# Patient Record
Sex: FEMALE | Employment: FULL TIME | URBAN - METROPOLITAN AREA
[De-identification: names, ages, dates, MRNs, and addresses within clinical notes are randomized per-mention and may not be internally consistent; named-entity substitution may affect disease eponyms.]

---

## 2024-05-16 ENCOUNTER — HOSPITAL ENCOUNTER (OUTPATIENT)
Dept: PHYSICAL THERAPY | Age: 19
Setting detail: THERAPIES SERIES
Discharge: HOME | End: 2024-05-16
Payer: COMMERCIAL

## 2024-05-16 DIAGNOSIS — R26.0 ATAXIC GAIT: ICD-10-CM

## 2024-05-16 DIAGNOSIS — M62.81 MUSCLE WEAKNESS (GENERALIZED): Primary | ICD-10-CM

## 2024-05-16 PROCEDURE — 97161 PT EVAL LOW COMPLEX 20 MIN: CPT

## 2024-05-16 PROCEDURE — 97530 THERAPEUTIC ACTIVITIES: CPT

## 2024-05-16 PROCEDURE — 97112 NEUROMUSCULAR REEDUCATION: CPT

## 2024-05-16 PROCEDURE — 97110 THERAPEUTIC EXERCISES: CPT
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
## 2024-05-20 ENCOUNTER — HOSPITAL ENCOUNTER (OUTPATIENT)
Dept: PHYSICAL THERAPY | Age: 19
Setting detail: THERAPIES SERIES
Discharge: HOME | End: 2024-05-20
Payer: COMMERCIAL

## 2024-05-20 PROCEDURE — 97110 THERAPEUTIC EXERCISES: CPT

## 2024-05-20 PROCEDURE — 97112 NEUROMUSCULAR REEDUCATION: CPT

## 2024-05-20 NOTE — FLOWSHEET NOTE
Dry Needle 3+ muscle (23222)     Iontophoresis (73979)    VASO (57404)     Ultrasound (65032)    Group Therapy (06814)     Estim Attended (82989)    Canalith Repositioning (58667)     Other:    Other:    Total Timed Code Tx Minutes 60 4       Total Treatment Minutes 60        Charge Justification:  (74947) THERAPEUTIC EXERCISE - Provided verbal/tactile cueing for activities related to strengthening, flexibility, endurance, ROM performed to prevent loss of range of motion, maintain or improve muscular strength or increase flexibility, following either an injury or surgery.   (00258) NEUROMUSCULAR RE-EDUCATION - Therapeutic procedure, 1 or more areas, each 15 minutes; neuromuscular reeducation of movement, balance, coordination, kinesthetic sense, posture, and/or proprioception for sitting and/or standing activities    GOALS     Patient stated goal: To be able to return to PLOF activity and work  [] Progressing: [] Met: [] Not Met: [] Adjusted    Therapist goals for Patient:   Short Term Goals: To be achieved in: 2 weeks  1. Independent in HEP and progression per patient tolerance, in order to prevent re-injury.   [] Progressing: [] Met: [] Not Met: [] Adjusted  2. Patient will have a decrease in pain to <1-2/10 to facilitate improvement in movement, function, and ADLs as indicated by Functional Deficits.  [] Progressing: [] Met: [] Not Met: [] Adjusted    Long Term Goals: To be achieved in: 12 weeks  1. Disability index score of 20% or less for the Modified Oswestry to assist with reaching prior level of function with activities such as tolerating sit .  [] Progressing: [] Met: [] Not Met: [] Adjusted  2. Patient will demonstrate increased AROM of Lumbar Spine to WFL without pain to allow for proper joint functioning to enable patient to maintain proper up right standing posture.   [] Progressing: [] Met: [] Not Met: [] Adjusted  3. Patient will demonstrate increased Strength of Lumbar and Core Stabilizers to

## 2024-05-29 ENCOUNTER — HOSPITAL ENCOUNTER (OUTPATIENT)
Dept: PHYSICAL THERAPY | Age: 19
Setting detail: THERAPIES SERIES
Discharge: HOME | End: 2024-05-29
Payer: COMMERCIAL

## 2024-05-29 PROCEDURE — 97110 THERAPEUTIC EXERCISES: CPT | Performed by: PHYSICAL THERAPY ASSISTANT

## 2024-05-29 PROCEDURE — 97112 NEUROMUSCULAR REEDUCATION: CPT | Performed by: PHYSICAL THERAPY ASSISTANT

## 2024-06-05 ENCOUNTER — HOSPITAL ENCOUNTER (OUTPATIENT)
Dept: PHYSICAL THERAPY | Age: 19
Setting detail: THERAPIES SERIES
Discharge: HOME | End: 2024-06-05
Payer: COMMERCIAL

## 2024-06-05 PROCEDURE — 97110 THERAPEUTIC EXERCISES: CPT | Performed by: PHYSICAL THERAPY ASSISTANT

## 2024-06-05 PROCEDURE — 97530 THERAPEUTIC ACTIVITIES: CPT | Performed by: PHYSICAL THERAPY ASSISTANT

## 2024-06-05 PROCEDURE — 97112 NEUROMUSCULAR REEDUCATION: CPT | Performed by: PHYSICAL THERAPY ASSISTANT

## 2024-06-05 NOTE — FLOWSHEET NOTE
[facility]- Outpatient Rehabilitation and Therapy [address], [place] [address] office: [phone] fax: [phone]         Physical Therapy: TREATMENT/PROGRESS NOTE   Patient: Telma Guerrero (19 y.o. female)   Examination Date: 2024   :  2005 MRN: [id number]   Visit #: 4   Insurance Allowable Auth Needed   20 []Yes    [x]No    Insurance: Payor: [employer] / Plan: [employer] - OPTIONS / Product Type: *No Product type* /   Insurance ID: [id number] - (Commercial)  Secondary Insurance (if applicable):    Treatment Diagnosis:     ICD-10-CM    1. Muscle weakness (generalized)  M62.81       2. Ataxic gait  R26.0          Medical Diagnosis:   Stable burst fracture of first lumbar vertebra, initial encounter for closed fracture [S32.011D] s/p Posterior Lumbar Spine Fusion,[id number] 4/10/24    Referring Physician: Thanh Rosado MD  PCP: Kendrick Novak MD     Plan of care signed (Y/N):     Date of Patient follow up with Physician: 2024     Progress Report/POC: NO  POC update due: (10 visits /OR AUTH LIMITS, whichever is less)  2024                                             Precautions/ Contra-indications:           Latex allergy:  NO  Pacemaker:    NO  Contraindications for Manipulation: recent surgical history (relative)  Date of Surgery: 4/10/2024  Other:    Red Flags:  None    C-SSRS Triggered by Intake questionnaire:   Patient answered 'NO' to both behavioral questions on intake.  No further screening warranted    Preferred Language for Healthcare:   [x] English       [] other:    SUBJECTIVE EXAMINATION     Patient stated complaint: States that she did OK after last visit but did have some muscle soreness. States that she stayed at her aunts for two days sleeping on a couch and watching a small baby, 4 months old, and is still a little sore from that.  She did not really do much in the way of exercises secondary to being there. Today

## 2024-06-12 ENCOUNTER — HOSPITAL ENCOUNTER (OUTPATIENT)
Dept: PHYSICAL THERAPY | Age: 19
Setting detail: THERAPIES SERIES
Discharge: HOME | End: 2024-06-12
Payer: COMMERCIAL

## 2024-06-12 PROCEDURE — 97110 THERAPEUTIC EXERCISES: CPT | Performed by: PHYSICAL THERAPY ASSISTANT

## 2024-06-12 PROCEDURE — 97112 NEUROMUSCULAR REEDUCATION: CPT | Performed by: PHYSICAL THERAPY ASSISTANT

## 2024-06-12 PROCEDURE — 97530 THERAPEUTIC ACTIVITIES: CPT | Performed by: PHYSICAL THERAPY ASSISTANT

## 2024-06-12 NOTE — FLOWSHEET NOTE
[facility]- Outpatient Rehabilitation and Therapy [address], [place] [address] office: [phone] fax: [phone]         Physical Therapy: TREATMENT/PROGRESS NOTE   Patient: Telma Guerrero (19 y.o. female)   Examination Date: 2024   :  2005 MRN: [id number]   Visit #: 5   Insurance Allowable Auth Needed   20 []Yes    [x]No    Insurance: Payor: [employer] / Plan: [employer] - OPTIONS / Product Type: *No Product type* /   Insurance ID: [id number] - (Commercial)  Secondary Insurance (if applicable):    Treatment Diagnosis:     ICD-10-CM    1. Muscle weakness (generalized)  M62.81       2. Ataxic gait  R26.0          Medical Diagnosis:   Stable burst fracture of first lumbar vertebra, initial encounter for closed fracture [S32.011D] s/p Posterior Lumbar Spine Fusion,[id number] 4/10/24    Referring Physician: Thanh Rosado MD  PCP: Kendrick Nvoak MD     Plan of care signed (Y/N):     Date of Patient follow up with Physician: 2024     Progress Report/POC: NO  POC update due: (10 visits /OR AUTH LIMITS, whichever is less)  2024                                             Precautions/ Contra-indications:           Latex allergy:  NO  Pacemaker:    NO  Contraindications for Manipulation: recent surgical history (relative)  Date of Surgery: 4/10/2024  Other:    Red Flags:  None    C-SSRS Triggered by Intake questionnaire:   Patient answered 'NO' to both behavioral questions on intake.  No further screening warranted    Preferred Language for Healthcare:   [x] English       [] other:    SUBJECTIVE EXAMINATION     Patient stated complaint: Stayed at Aunts house again and slept on couch and watched baby but did avoid getting in the pool and laying on raft and felt better.  She does plan on being in the pool and we discussed exercise options in the water and postures in the water. Still gets intermittent shooting pains intermittently with

## 2024-06-19 ENCOUNTER — HOSPITAL ENCOUNTER (OUTPATIENT)
Dept: PHYSICAL THERAPY | Age: 19
Setting detail: THERAPIES SERIES
Discharge: HOME | End: 2024-06-19
Payer: COMMERCIAL

## 2024-06-19 PROCEDURE — 97110 THERAPEUTIC EXERCISES: CPT | Performed by: PHYSICAL THERAPY ASSISTANT

## 2024-06-19 PROCEDURE — 97112 NEUROMUSCULAR REEDUCATION: CPT | Performed by: PHYSICAL THERAPY ASSISTANT

## 2024-06-19 PROCEDURE — 97530 THERAPEUTIC ACTIVITIES: CPT | Performed by: PHYSICAL THERAPY ASSISTANT

## 2024-06-19 NOTE — PLAN OF CARE
swelling/inflammation/restriction, static and dynamic balance, kinesthetic sense and proprioception, and improving postural awareness. Patient will continue to benefit from ongoing evaluation and advanced clinical decision from a Physical Therapist to improve pain control, muscle strength, endurance, balance and proprioception, functional mobility, ADL status, proper body mechanics, tolerance to work activity, and high level IADLs to safely return to OF without symptoms or restrictions.      Medical Necessity Documentation:  I certify that this patient meets the below criteria necessary for medical necessity for care and/or justification of therapy services:  The patient has a musculoskeletal condition(s) with a corresponding ICD-10 code that is of complexity and severity that require skilled therapeutic intervention. This has a direct and significant impact on the need for therapy and significantly impacts the rate of recovery.     Return to Play: NA    Prognosis for POC: [x] Good [] Fair  [] Poor    Patient requires continued skilled intervention: [x] Yes  [] No      CHARGE CAPTURE     PT CHARGE GRID   CPT Code (TIMED) minutes # CPT Code (UNTIMED) #     Therex (26448)  32 2  EVAL:LOW (79541 - Typically 20 minutes face-to-face)     Neuromusc. Re-ed (55755) 13 1  Re-Eval (01133)     Manual (87974)    Estim Unattended (34947)     Ther. Act (12394) 17 1  [facility]. Traction (04974)     Gait (09288)    Dry Needle 1-2 muscle (20560)     Aquatic Therex (86970)    Dry Needle 3+ muscle (20561)     Iontophoresis (25021)    VASO (76932)     Ultrasound (45934)    Group Therapy (96170)     Estim Attended (70688)    Canalith Repositioning (57483)     Other:    Other:    Total Timed Code Tx Minutes 62 4       Total Treatment Minutes 62        Charge Justification:  (67918) THERAPEUTIC EXERCISE - Provided verbal/tactile cueing for activities related to strengthening, flexibility, endurance, ROM performed to prevent loss of range of

## 2024-06-26 ENCOUNTER — HOSPITAL ENCOUNTER (OUTPATIENT)
Dept: PHYSICAL THERAPY | Age: 19
Setting detail: THERAPIES SERIES
Discharge: HOME | End: 2024-06-26
Payer: COMMERCIAL

## 2024-06-26 PROCEDURE — 97112 NEUROMUSCULAR REEDUCATION: CPT | Performed by: PHYSICAL THERAPY ASSISTANT

## 2024-06-26 PROCEDURE — 97110 THERAPEUTIC EXERCISES: CPT | Performed by: PHYSICAL THERAPY ASSISTANT

## 2024-06-26 PROCEDURE — 97530 THERAPEUTIC ACTIVITIES: CPT | Performed by: PHYSICAL THERAPY ASSISTANT

## 2024-06-26 NOTE — FLOWSHEET NOTE
06/26/2024     Note: Portions of this note have been templated and/or copied from initial evaluation, reassessments and prior notes for documentation efficiency.    Note: If patient does not return for scheduled/recommended follow up visits, this note will serve as a discharge from care along with the most recent update on progress.    Ortho Evaluation

## 2024-07-03 ENCOUNTER — HOSPITAL ENCOUNTER (OUTPATIENT)
Dept: PHYSICAL THERAPY | Age: 19
Setting detail: THERAPIES SERIES
Discharge: HOME | End: 2024-07-03
Payer: COMMERCIAL

## 2024-07-03 ENCOUNTER — HOSPITAL ENCOUNTER (OUTPATIENT)
Dept: PHYSICAL THERAPY | Age: 19
Setting detail: THERAPIES SERIES
End: 2024-07-03
Payer: COMMERCIAL

## 2024-07-03 PROCEDURE — 97112 NEUROMUSCULAR REEDUCATION: CPT

## 2024-07-03 PROCEDURE — 97140 MANUAL THERAPY 1/> REGIONS: CPT

## 2024-07-03 PROCEDURE — 97110 THERAPEUTIC EXERCISES: CPT

## 2024-07-03 NOTE — FLOWSHEET NOTE
[facility]- Outpatient Rehabilitation and Therapy [address], [place] [address] office: [phone] fax: [phone]       Physical Therapy: TREATMENT/PROGRESS NOTE   Patient: Telma Guerrero (19 y.o. female)   Examination Date: 2024   :  2005 MRN: [id number]   Visit #: 8   Insurance Allowable Auth Needed   20 []Yes    [x]No    Insurance: Payor: [employer] / Plan: [employer] HE[employer] - OPTIONS / Product Type: *No Product type* /   Insurance ID: [id number] - (Commercial)  Secondary Insurance (if applicable):    Treatment Diagnosis:     ICD-10-CM    1. Muscle weakness (generalized)  M62.81       2. Ataxic gait  R26.0          Medical Diagnosis:   Stable burst fracture of first lumbar vertebra, initial encounter for closed fracture [S32.011D] s/p Posterior Lumbar Spine Fusion,[id number] 4/10/24    Referring Physician: Thanh Rosado MD  PCP: Kendrick Novak MD     Plan of care signed (Y/N):     Date of Patient follow up with Physician: 2024     Progress Report/POC: NO  POC update due: (10 visits /OR AUTH LIMITS, whichever is less)  2024                                             Precautions/ Contra-indications:           Latex allergy:  NO  Pacemaker:    NO  Contraindications for Manipulation: recent surgical history (relative)  Date of Surgery: 4/10/2024  Other:    Red Flags:  None    C-SSRS Triggered by Intake questionnaire:   Patient answered 'NO' to both behavioral questions on intake.  No further screening warranted    Preferred Language for Healthcare:   [x] English       [] other:    SUBJECTIVE EXAMINATION     Patient stated complaint:  Patient reports no new complaints.      Test used Initial score  2024   Pain Summary VAS 3-6/10 0-1/10   Functional questionnaire Modified Oswestry   48%   26%   Other:                OBJECTIVE EXAMINATION     24  ROM/Strength: (Blank cells denote NT)      Mvmt (norm) AROM L

## 2024-07-10 ENCOUNTER — HOSPITAL ENCOUNTER (OUTPATIENT)
Dept: PHYSICAL THERAPY | Age: 19
Setting detail: THERAPIES SERIES
Discharge: HOME | End: 2024-07-10
Payer: COMMERCIAL

## 2024-07-10 PROCEDURE — 97112 NEUROMUSCULAR REEDUCATION: CPT | Performed by: PHYSICAL THERAPY ASSISTANT

## 2024-07-10 PROCEDURE — 97530 THERAPEUTIC ACTIVITIES: CPT | Performed by: PHYSICAL THERAPY ASSISTANT

## 2024-07-10 PROCEDURE — 97110 THERAPEUTIC EXERCISES: CPT | Performed by: PHYSICAL THERAPY ASSISTANT

## 2024-07-10 NOTE — FLOWSHEET NOTE
[facility]- Outpatient Rehabilitation and Therapy [address], [place] [address] office: [phone] fax: [phone]       Physical Therapy: TREATMENT/PROGRESS NOTE   Patient: Telma Guerrero (19 y.o. female)   Examination Date: 07/10/2024   :  2005 MRN: [id number]   Visit #: 9   Insurance Allowable Auth Needed   20 []Yes    [x]No    Insurance: Payor: UNITED HE[employer]ARE / Plan: UNITED HEALTHCARE - OPTIONS / Product Type: *No Product type* /   Insurance ID: [id number] - (Commercial)  Secondary Insurance (if applicable):    Treatment Diagnosis:     ICD-10-CM    1. Muscle weakness (generalized)  M62.81       2. Ataxic gait  R26.0          Medical Diagnosis:   Stable burst fracture of first lumbar vertebra, initial encounter for closed fracture [S32.011D] s/p Posterior Lumbar Spine Fusion,[id number] 4/10/24    Referring Physician: Thanh Rosado MD  PCP: Kendrick Novak MD     Plan of care signed (Y/N):     Date of Patient follow up with Physician: 2024     Progress Report/POC: NO  POC update due: (10 visits /OR AUTH LIMITS, whichever is less)  2024                                             Precautions/ Contra-indications:           Latex allergy:  NO  Pacemaker:    NO  Contraindications for Manipulation: recent surgical history (relative)  Date of Surgery: 4/10/2024  Other:    Red Flags:  None    C-SSRS Triggered by Intake questionnaire:   Patient answered 'NO' to both behavioral questions on intake.  No further screening warranted    Preferred Language for Healthcare:   [x] English       [] other:    SUBJECTIVE EXAMINATION     Patient stated complaint:  States that on Thursday she woke up with increased pain to the point that it was hard for her to get dressed secondary to shocking pain in her left hip.  This has slowly decreased over the past several days - this morning was easier to get her pants on.  She has been working on her stretches.  Also went

## 2024-07-17 ENCOUNTER — HOSPITAL ENCOUNTER (OUTPATIENT)
Dept: PHYSICAL THERAPY | Age: 19
Setting detail: THERAPIES SERIES
Discharge: HOME | End: 2024-07-17
Payer: COMMERCIAL

## 2024-07-17 PROCEDURE — 97112 NEUROMUSCULAR REEDUCATION: CPT | Performed by: PHYSICAL THERAPY ASSISTANT

## 2024-07-17 PROCEDURE — 97530 THERAPEUTIC ACTIVITIES: CPT | Performed by: PHYSICAL THERAPY ASSISTANT

## 2024-07-17 PROCEDURE — 97110 THERAPEUTIC EXERCISES: CPT | Performed by: PHYSICAL THERAPY ASSISTANT

## 2024-07-17 NOTE — PLAN OF CARE
positions, managing bed mobility, reaching overhead, carrying items, lifting items, pushing or pulling activity, don/doff shoes/socks, ADLs, heavy home activity, and yardwork .  During therapy this date, patient required verbal cueing, modification of technique, and progression of exercises and program for exercise progression, improving proper muscle recruitment and activation/motor control patterns, modulating pain, increasing ROM, reduce/eliminate soft tissue swelling/inflammation/restriction, static and dynamic balance, kinesthetic sense and proprioception, and improving postural awareness. Patient will continue to benefit from ongoing evaluation and advanced clinical decision from a Physical Therapist to improve pain control, muscle strength, endurance, balance and proprioception, functional mobility, ADL status, proper body mechanics, and tolerance to work activity to safely return to [facility] without symptoms or restrictions.      Medical Necessity Documentation:  I certify that this patient meets the below criteria necessary for medical necessity for care and/or justification of therapy services:  The patient has a musculoskeletal condition(s) with a corresponding ICD-10 code that is of complexity and severity that require skilled therapeutic intervention. This has a direct and significant impact on the need for therapy and significantly impacts the rate of recovery.     Return to Play: NA    Prognosis for POC: [x] Good [] Fair  [] Poor    Patient requires continued skilled intervention: [x] Yes  [] No      CHARGE CAPTURE     PT CHARGE GRID   CPT Code (TIMED) minutes # CPT Code (UNTIMED) #     Therex (56327)  30' 2  EVAL:LOW (78700 - Typically 20 minutes face-to-face)     Neuromusc. Re-ed (22711) 15' 1  Re-Eval (28029)     Manual (51464)    Estim Unattended (95172)     Ther. Act (26295) 16' 1  Mech. Traction (57941)     Gait (81412)    Dry Needle 1-2 muscle (70082)     Aquatic Therex (71748)    Dry Needle 3+

## 2024-07-24 ENCOUNTER — HOSPITAL ENCOUNTER (OUTPATIENT)
Dept: PHYSICAL THERAPY | Age: 19
Setting detail: THERAPIES SERIES
Discharge: HOME | End: 2024-07-24
Payer: COMMERCIAL

## 2024-07-24 PROCEDURE — 97140 MANUAL THERAPY 1/> REGIONS: CPT

## 2024-07-24 PROCEDURE — 97110 THERAPEUTIC EXERCISES: CPT

## 2024-07-24 PROCEDURE — 97112 NEUROMUSCULAR REEDUCATION: CPT

## 2024-07-24 NOTE — FLOWSHEET NOTE
pulling, jumping.)  Direct, one on one contact, billed in 15-minute increments.    GOALS     Patient stated goal: To be able to return to PLOF activity and work  [x] Progressing: [] Met: [] Not Met: [] Adjusted    Therapist goals for Patient:   Short Term Goals: To be achieved in: 2 weeks  1. Independent in HEP and progression per patient tolerance, in order to prevent re-injury.   [] Progressing: [x] Met: [] Not Met: [] Adjusted  2. Patient will have a decrease in pain to <1-2/10 to facilitate improvement in movement, function, and ADLs as indicated by Functional Deficits.  [x] Progressing: [] Met: [] Not Met: [] Adjusted    Long Term Goals: To be achieved in: 12 weeks  1. Disability index score of 20% or less for the Modified Oswestry to assist with reaching prior level of function with activities such as tolerating sit .  [x] Progressing: [] Met: [] Not Met: [] Adjusted  2. Patient will demonstrate increased AROM of Lumbar Spine to WFL without pain to allow for proper joint functioning to enable patient to maintain proper up right standing posture.   [x] Progressing: [] Met: [] Not Met: [] Adjusted  3. Patient will demonstrate increased Strength of Lumbar and Core Stabilizers to demonstrate improved muscle activation/motor control to allow for proper functional mobility to enable patient to return to proper standing posture.   [x] Progressing: [] Met: [] Not Met: [] Adjusted  4. Patient will return to work for 8 hrs without increased symptoms or restriction.   [x] Progressing: [] Met: [] Not Met: [] Adjusted  5. To be able to walking  (patient specific functional goal)    [] Progressing: [x] Met: [] Not Met: [] Adjusted     Overall Progression Towards Functional goals/ Treatment Progress Update:  [x] Patient is progressing as expected towards functional goals listed.    [] Progression is slowed due to complexities/Impairments listed.  [] Progression has been slowed due to co-morbidities.  [] Plan just

## 2024-07-31 ENCOUNTER — HOSPITAL ENCOUNTER (OUTPATIENT)
Dept: PHYSICAL THERAPY | Age: 19
Setting detail: THERAPIES SERIES
Discharge: HOME | End: 2024-07-31
Payer: COMMERCIAL

## 2024-07-31 PROCEDURE — 97112 NEUROMUSCULAR REEDUCATION: CPT | Performed by: PHYSICAL THERAPY ASSISTANT

## 2024-07-31 PROCEDURE — 97110 THERAPEUTIC EXERCISES: CPT | Performed by: PHYSICAL THERAPY ASSISTANT

## 2024-07-31 PROCEDURE — 97530 THERAPEUTIC ACTIVITIES: CPT | Performed by: PHYSICAL THERAPY ASSISTANT

## 2024-07-31 NOTE — FLOWSHEET NOTE
Notes/Cues/Progressions          HL Hip Abd 3 Way    HL Hip Add Squeeze           Heel Slides   20 x ea R,L    Single knee to chest    5\" x10 R, L     HL Lumbar Rotation (limited Range)   20 x ea side    Bent Knee Alt Fallouts   10 x ea    Piriformis    3x30\" ea light stretch                   Seated EOB HS Stretch  30\" 3 x ea     LAQ 2# 5\"   2x10 R, L     SAQ     Supine march            Rows TBand green  30 x    Ext TBand green  30 x    B ER TBand green  20 x    Wall push up    20 x                                  Standing HR    30 x      Slant Board stretch   30\" 3 x            Standing HS Curl  2#  2 15 x  Focus on ab set with fingertip help for balance   Standing hip abd B 2# 2 10 x \"   Alt march standing  2#  30 x  R, L  \"   Sit to stand      Mini Squats    x20    FSU   6\" 2x10           Divers stretch   3x30\"  Focus on upper stretch b/w scap   Seated edge of chair push/pull PGB  x20                  Seated edge of chair PNF PGB  2x10 R, L     Manual Intervention (03294)  TIME            HS Stretch /  hip flexor stretch                            NMR re-education (20762) resistance Sets/time Reps CUES NEEDED   PPT with Glut Set     Glut Sets     Quad Sets     Hamstring Sets            Therapeutic Activity (64114)  Sets/time     Patient education for proper bed mobility and posture   5'                                   Modalities:    No modalities applied this session    Education/Home Exercise Program: Patient HEP program created electronically.  Refer to [employer] access code: Access Code: [email or web address]  Access Code: [email or web address]  URL: [email or web address]  Date: 05/16/2024  Prepared by: Gabbie Celaya    Exercises  - Supine Pelvic Tilt  - 1 x daily - 7 x weekly - 1 sets - 20 reps - 5\" hold  - Hooklying Lumbar Rotation  - 1 x daily - 7 x weekly - 1 sets - 15 reps - 5\" hold  - Bent Knee Fallouts with Alternating Legs  - 1 x daily - 7 x weekly - 1 sets - 15 reps - 5\" hold  - Supine Hip Adduction Isometric with

## 2024-08-07 ENCOUNTER — HOSPITAL ENCOUNTER (OUTPATIENT)
Dept: PHYSICAL THERAPY | Age: 19
Setting detail: THERAPIES SERIES
Discharge: HOME | End: 2024-08-07
Payer: COMMERCIAL

## 2024-08-07 PROCEDURE — 97112 NEUROMUSCULAR REEDUCATION: CPT

## 2024-08-07 PROCEDURE — 97110 THERAPEUTIC EXERCISES: CPT

## 2024-08-07 NOTE — FLOWSHEET NOTE
[facility]- Outpatient Rehabilitation and Therapy [address], [place] [address] office: [phone] fax: [phone]    Physical Therapy: TREATMENT/PROGRESS NOTE   Patient: Telma Guerrero (19 y.o. female)   Examination Date: 2024   :  2005 MRN: [id number]   Visit #: 13   Insurance Allowable Auth Needed   20 []Yes    [x]No    Insurance: Payor: [employer]ARE / Plan: [employer] HE[employer] - OPTIONS / Product Type: *No Product type* /   Insurance ID: [id number] - (Commercial)  Secondary Insurance (if applicable):    Treatment Diagnosis:     ICD-10-CM    1. Muscle weakness (generalized)  M62.81       2. Ataxic gait  R26.0          Medical Diagnosis:   Stable burst fracture of first lumbar vertebra, initial encounter for closed fracture [S32.011D] s/p Posterior Lumbar Spine Fusion,[id number] 4/10/24    Referring Physician: Thanh Rosado MD  PCP: Kendrick Novak MD     Plan of care signed (Y/N):     Date of Patient follow up with Physician: 2024     Progress Report/POC: NO  POC update due: (10 visits /OR AUTH LIMITS, whichever is less)  2024                                             Precautions/ Contra-indications:           Latex allergy:  NO  Pacemaker:    NO  Contraindications for Manipulation: recent surgical history (relative)  Date of Surgery: 4/10/2024  Other:    Red Flags:  None    C-SSRS Triggered by Intake questionnaire:   Patient answered 'NO' to both behavioral questions on intake.  No further screening warranted    Preferred Language for Healthcare:   [x] English       [] other:    SUBJECTIVE EXAMINATION     Patient stated complaint:  Patient reports having increased difficulty/pain this morning states had a \"rough\" day at work last night and was very busy.       Test used Initial score  2024   Pain Summary VAS 3-6/10 7/10   Functional questionnaire Modified Oswestry   48%   26%   Other:                OBJECTIVE

## 2024-08-13 ENCOUNTER — HOSPITAL ENCOUNTER (OUTPATIENT)
Dept: PHYSICAL THERAPY | Age: 19
Setting detail: THERAPIES SERIES
End: 2024-08-13
Payer: COMMERCIAL

## 2024-08-21 ENCOUNTER — HOSPITAL ENCOUNTER (OUTPATIENT)
Dept: PHYSICAL THERAPY | Age: 19
Setting detail: THERAPIES SERIES
Discharge: HOME | End: 2024-08-21
Payer: COMMERCIAL

## 2024-08-21 PROCEDURE — 97112 NEUROMUSCULAR REEDUCATION: CPT

## 2024-08-21 PROCEDURE — 97110 THERAPEUTIC EXERCISES: CPT

## 2024-08-28 ENCOUNTER — HOSPITAL ENCOUNTER (OUTPATIENT)
Dept: PHYSICAL THERAPY | Age: 19
Setting detail: THERAPIES SERIES
End: 2024-08-28
Payer: COMMERCIAL

## 2024-09-04 ENCOUNTER — HOSPITAL ENCOUNTER (OUTPATIENT)
Dept: PHYSICAL THERAPY | Age: 19
Setting detail: THERAPIES SERIES
Discharge: HOME | End: 2024-09-04
Payer: COMMERCIAL

## 2024-09-04 PROCEDURE — 97110 THERAPEUTIC EXERCISES: CPT

## 2024-09-04 PROCEDURE — 97112 NEUROMUSCULAR REEDUCATION: CPT

## 2024-09-04 NOTE — FLOWSHEET NOTE
address and improve pain control, ROM, muscle strength, endurance, normalization of gait, functional mobility, ADL status, tolerance to work activity, and high level IADLs to safely return to [facility] without symptoms or restrictions.        Medical Necessity Documentation:  I certify that this patient meets the below criteria necessary for medical necessity for care and/or justification of therapy services:  The patient has a musculoskeletal condition(s) with a corresponding ICD-10 code that is of complexity and severity that require skilled therapeutic intervention. This has a direct and significant impact on the need for therapy and significantly impacts the rate of recovery.     Return to Play: NA    Prognosis for POC: [x] Good [] Fair  [] Poor    Patient requires continued skilled intervention: [x] Yes  [] No      CHARGE CAPTURE     PT CHARGE GRID   CPT Code (TIMED) minutes # CPT Code (UNTIMED) #     Therex (94893)  28' 2  EVAL:LOW (09562 - Typically 20 minutes face-to-face)     Neuromusc. Re-ed (92700) 30' 2  Re-Eval (82762)     Manual (86426)    Estim Unattended (49270)     Ther. Act (78750)    Mech. Traction (04925)     Gait (79396)    Dry Needle 1-2 muscle (04822)     Aquatic Therex (45941)    Dry Needle 3+ muscle (97267)     Iontophoresis (20749)    VASO (44110)     Ultrasound (42409)    Group Therapy (40484)     Estim Attended (08077)    Canalith Repositioning (42698)     Other:    Other:      Total Timed Code Tx Minutes 58' 4       Total Treatment Minutes 58'        Charge Justification:  (82299) THERAPEUTIC EXERCISE - Provided verbal/tactile cueing for activities related to strengthening, flexibility, endurance, ROM performed to prevent loss of range of motion, maintain or improve muscular strength or increase flexibility, following either an injury or surgery.   (07185) NEUROMUSCULAR RE-EDUCATION - Therapeutic procedure, 1 or more areas, each 15 minutes; neuromuscular reeducation of movement, balance,

## 2024-09-04 NOTE — PLAN OF CARE
[facility]- Outpatient Rehabilitation and Therapy [address], [place] [address] office: [phone] fax: [phone]       Physical Therapy: TREATMENT/PROGRESS NOTE   Patient: Telma Guerrero (19 y.o. female)   Examination Date: 2024   :  2005 MRN: [id number]   Visit #: 15   Insurance Allowable Auth Needed   20 []Yes    [x]No    Insurance: Payor: [employer] / Plan: [employer] - OPTIONS / Product Type: *No Product type* /   Insurance ID: [id number] - (Commercial)  Secondary Insurance (if applicable):    Treatment Diagnosis:     ICD-10-CM    1. Muscle weakness (generalized)  M62.81       2. Ataxic gait  R26.0          Medical Diagnosis:   Stable burst fracture of first lumbar vertebra, initial encounter for closed fracture [S32.011D] s/p Posterior Lumbar Spine Fusion,[id number] 4/10/24    Referring Physician: Thanh Rosado MD  PCP: Kendrick Novak MD     Plan of care signed (Y/N):     Date of Patient follow up with Physician: 2024     Progress Report/POC: NO  POC update due: (10 visits /OR AUTH LIMITS, whichever is less)  2024                                             Precautions/ Contra-indications:           Latex allergy:  NO  Pacemaker:    NO  Contraindications for Manipulation: recent surgical history (relative)  Date of Surgery: 4/10/2024  Other:    Red Flags:  None    C-SSRS Triggered by Intake questionnaire:   Patient answered 'NO' to both behavioral questions on intake.  No further screening warranted    Preferred Language for Healthcare:   [x] English       [] other:    SUBJECTIVE EXAMINATION     Patient stated complaint:  Pt states she is getting a MRI tomorrow regarding hip pain. Pt states she is doing ok today but just overall soreness. Pt state stand up desk is helping with symptoms at work.      Test used Initial score  2024   Pain Summary VAS 3-6/10 4-5/10 current    6-7/10 at worst   Functional

## 2024-09-11 ENCOUNTER — HOSPITAL ENCOUNTER (OUTPATIENT)
Dept: PHYSICAL THERAPY | Age: 19
Setting detail: THERAPIES SERIES
Discharge: HOME | End: 2024-09-11
Payer: COMMERCIAL

## 2024-09-11 PROCEDURE — 97112 NEUROMUSCULAR REEDUCATION: CPT

## 2024-09-11 PROCEDURE — 97110 THERAPEUTIC EXERCISES: CPT

## 2024-09-16 ENCOUNTER — HOSPITAL ENCOUNTER (OUTPATIENT)
Dept: PHYSICAL THERAPY | Age: 19
Setting detail: THERAPIES SERIES
End: 2024-09-16
Payer: COMMERCIAL

## 2024-09-18 ENCOUNTER — APPOINTMENT (OUTPATIENT)
Dept: PHYSICAL THERAPY | Age: 19
End: 2024-09-18
Payer: COMMERCIAL

## 2024-09-19 ENCOUNTER — HOSPITAL ENCOUNTER (OUTPATIENT)
Dept: PHYSICAL THERAPY | Age: 19
Setting detail: THERAPIES SERIES
Discharge: HOME | End: 2024-09-19
Payer: COMMERCIAL

## 2024-09-19 PROCEDURE — 97110 THERAPEUTIC EXERCISES: CPT

## 2024-09-19 PROCEDURE — 97112 NEUROMUSCULAR REEDUCATION: CPT

## 2024-09-23 ENCOUNTER — HOSPITAL ENCOUNTER (OUTPATIENT)
Dept: PHYSICAL THERAPY | Age: 19
Setting detail: THERAPIES SERIES
End: 2024-09-23
Payer: COMMERCIAL

## 2024-09-25 ENCOUNTER — APPOINTMENT (OUTPATIENT)
Dept: PHYSICAL THERAPY | Age: 19
End: 2024-09-25
Payer: COMMERCIAL

## 2024-10-01 ENCOUNTER — HOSPITAL ENCOUNTER (OUTPATIENT)
Dept: PHYSICAL THERAPY | Age: 19
Setting detail: THERAPIES SERIES
Discharge: HOME | End: 2024-10-01
Payer: COMMERCIAL

## 2024-10-01 PROCEDURE — 97110 THERAPEUTIC EXERCISES: CPT | Performed by: PHYSICAL THERAPY ASSISTANT

## 2024-10-01 PROCEDURE — 97112 NEUROMUSCULAR REEDUCATION: CPT | Performed by: PHYSICAL THERAPY ASSISTANT

## 2024-10-01 NOTE — PLAN OF CARE
[facility]- Outpatient Rehabilitation and Therapy [address], [place] [address] office: [phone] fax: [phone]     Physical Therapy: TREATMENT/PROGRESS NOTE   Patient: Telma Guerrero (19 y.o. female)   Examination Date: 10/01/2024   :  2005 MRN: [id number]   Visit #: 18   Insurance Allowable Auth Needed   20 []Yes    [x]No    Insurance: Payor: [employer] / Plan: [employer] - OPTIONS / Product Type: *No Product type* /   Insurance ID: [id number] - (Commercial)  Secondary Insurance (if applicable):    Treatment Diagnosis:     ICD-10-CM    1. Muscle weakness (generalized)  M62.81       2. Ataxic gait  R26.0          Medical Diagnosis:   Stable burst fracture of first lumbar vertebra, initial encounter for closed fracture [S32.011D] s/p Posterior Lumbar Spine Fusion,[id number] 4/10/24    Referring Physician: Thanh Rosado MD  PCP: Kendrick Novak MD     Plan of care signed (Y/N):     Date of Patient follow up with Physician: 2024     Progress Report/POC: NO  POC update due: (10 visits /OR AUTH LIMITS, whichever is less)  10/20/2024                                             Precautions/ Contra-indications:           Latex allergy:  NO  Pacemaker:    NO  Contraindications for Manipulation: recent surgical history (relative)  Date of Surgery: 4/10/2024  Other:    Red Flags:  None    C-SSRS Triggered by Intake questionnaire:   Patient answered 'NO' to both behavioral questions on intake.  No further screening warranted    Preferred Language for Healthcare:   [x] English       [] other:    SUBJECTIVE EXAMINATION     Patient stated complaint:  States that yesterday and today she has been having a lot of soreness through her rib cage bilaterally.  She has been holding aunts baby a lot as well and doing a lot of driving getting there and back.  Reminded her of the importance of her body mechanics especially when she is lifting and carrying the

## 2024-10-09 ENCOUNTER — HOSPITAL ENCOUNTER (OUTPATIENT)
Dept: PHYSICAL THERAPY | Age: 19
Setting detail: THERAPIES SERIES
Discharge: HOME | End: 2024-10-09
Payer: COMMERCIAL

## 2024-10-09 PROCEDURE — 97140 MANUAL THERAPY 1/> REGIONS: CPT

## 2024-10-09 PROCEDURE — 97110 THERAPEUTIC EXERCISES: CPT

## 2024-10-09 PROCEDURE — 97112 NEUROMUSCULAR REEDUCATION: CPT

## 2024-10-09 NOTE — FLOWSHEET NOTE
for sitting and/or standing activities    GOALS     Patient stated goal: To be able to return to PLOF activity and work  [x] Progressing: [] Met: [] Not Met: [] Adjusted    Therapist goals for Patient:   Short Term Goals: To be achieved in: 2 weeks  1. Independent in HEP and progression per patient tolerance, in order to prevent re-injury.   [] Progressing: [x] Met: [] Not Met: [] Adjusted  2. Patient will have a decrease in pain to <1-2/10 to facilitate improvement in movement, function, and ADLs as indicated by Functional Deficits.  [x] Progressing: [] Met: [] Not Met: [] Adjusted    Long Term Goals: To be achieved in: 12 weeks  1. Disability index score of 20% or less for the Modified Oswestry to assist with reaching prior level of function with activities such as tolerating sit .  [x] Progressing: [] Met: [] Not Met: [] Adjusted  2. Patient will demonstrate increased AROM of Lumbar Spine to WFL without pain to allow for proper joint functioning to enable patient to maintain proper up right standing posture.   [x] Progressing: [] Met: [] Not Met: [] Adjusted  3. Patient will demonstrate increased Strength of Lumbar and Core Stabilizers to demonstrate improved muscle activation/motor control to allow for proper functional mobility to enable patient to return to proper standing posture.   [x] Progressing: [] Met: [] Not Met: [] Adjusted  4. Patient will return to work for 8 hrs without increased symptoms or restriction.   [x] Progressing: [] Met: [] Not Met: [] Adjusted  5. To be able to walking  (patient specific functional goal)    [] Progressing: [x] Met: [] Not Met: [] Adjusted     Overall Progression Towards Functional goals/ Treatment Progress Update:  [x] Patient is progressing as expected towards functional goals listed.    [] Progression is slowed due to complexities/Impairments listed.  [] Progression has been slowed due to co-morbidities.  [] Plan just implemented, too soon (<30days) to assess goals

## 2024-10-16 ENCOUNTER — HOSPITAL ENCOUNTER (OUTPATIENT)
Dept: PHYSICAL THERAPY | Age: 19
Setting detail: THERAPIES SERIES
Discharge: HOME | End: 2024-10-16
Payer: COMMERCIAL

## 2024-10-16 PROCEDURE — 97112 NEUROMUSCULAR REEDUCATION: CPT | Performed by: PHYSICAL THERAPY ASSISTANT

## 2024-10-16 PROCEDURE — 97110 THERAPEUTIC EXERCISES: CPT | Performed by: PHYSICAL THERAPY ASSISTANT

## 2024-10-16 NOTE — DISCHARGE SUMMARY
[facility]- Outpatient Rehabilitation and Therapy [address]. [place] [address] office: [phone] fax: [phone]      Physical Therapy Discharge Summary    Dear Thanh Rosado MD   ,    We had the pleasure of treating the following patient for physical therapy services at [facility].  A summary of our findings can be found in the discharge summary below.  If you have any questions or concerns regarding these findings, please do not hesitate to contact me at the office phone number checked above.  Thank you for the referral.       Functional Outcome: Modified Oswestry:  = 14 % disability    Total Visits: 20     Plan of Care: Discharge from Physical Therapy    Reason for Discharge:  All Goals achieved and Patient should continue to improve in reasonable time if they continue HEP            Physical Therapy: TREATMENT/PROGRESS NOTE   Patient: Telma Guerrero (19 y.o. female)   Examination Date: 10/16/2024   :  2005 MRN: [id number]   Visit #: 20   Insurance Allowable Auth Needed   20 []Yes    [x]No    Insurance: Payor: [employer] / Plan: [employer] - OPTIONS / Product Type: *No Product type* /   Insurance ID: [id number] - (Commercial)  Secondary Insurance (if applicable):    Treatment Diagnosis:     ICD-10-CM    1. Muscle weakness (generalized)  M62.81       2. Ataxic gait  R26.0          Medical Diagnosis:   Stable burst fracture of first lumbar vertebra, initial encounter for closed fracture [S32.011D] s/p Posterior Lumbar Spine Fusion,[id number] 4/10/24    Referring Physician: Thanh Rosado MD  PCP: Kendrick Novak MD     Plan of care signed (Y/N):     Date of Patient follow up with Physician: 2024     Progress Report/POC: NO  POC update due: (10 visits /OR AUTH LIMITS, whichever is less)  10/20/2024                                             Precautions/ Contra-indications:           Latex allergy: